# Patient Record
Sex: MALE | Race: WHITE | NOT HISPANIC OR LATINO | URBAN - METROPOLITAN AREA
[De-identification: names, ages, dates, MRNs, and addresses within clinical notes are randomized per-mention and may not be internally consistent; named-entity substitution may affect disease eponyms.]

---

## 2017-01-04 ENCOUNTER — INPATIENT (INPATIENT)
Facility: HOSPITAL | Age: 82
LOS: 7 days | Discharge: HOME | DRG: 61 | End: 2017-01-12
Attending: PSYCHIATRY & NEUROLOGY | Admitting: PSYCHIATRY & NEUROLOGY
Payer: MEDICARE

## 2017-01-04 VITALS
TEMPERATURE: 98 F | DIASTOLIC BLOOD PRESSURE: 85 MMHG | SYSTOLIC BLOOD PRESSURE: 175 MMHG | RESPIRATION RATE: 18 BRPM | HEART RATE: 75 BPM | OXYGEN SATURATION: 96 %

## 2017-01-04 DIAGNOSIS — Z78.9 OTHER SPECIFIED HEALTH STATUS: Chronic | ICD-10-CM

## 2017-01-04 DIAGNOSIS — I63.9 CEREBRAL INFARCTION, UNSPECIFIED: ICD-10-CM

## 2017-01-04 DIAGNOSIS — Z98.890 OTHER SPECIFIED POSTPROCEDURAL STATES: Chronic | ICD-10-CM

## 2017-01-04 LAB
ALBUMIN SERPL ELPH-MCNC: 3.9 G/DL — SIGNIFICANT CHANGE UP (ref 3.3–5)
ALP SERPL-CCNC: 51 U/L — SIGNIFICANT CHANGE UP (ref 40–120)
ALT FLD-CCNC: 20 U/L RC — SIGNIFICANT CHANGE UP (ref 10–45)
ANION GAP SERPL CALC-SCNC: 12 MMOL/L — SIGNIFICANT CHANGE UP (ref 5–17)
APTT BLD: 27.5 SEC — SIGNIFICANT CHANGE UP (ref 27.5–37.4)
AST SERPL-CCNC: 19 U/L — SIGNIFICANT CHANGE UP (ref 10–40)
BASOPHILS # BLD AUTO: 0 K/UL — SIGNIFICANT CHANGE UP (ref 0–0.2)
BASOPHILS NFR BLD AUTO: 0.7 % — SIGNIFICANT CHANGE UP (ref 0–2)
BILIRUB SERPL-MCNC: 0.3 MG/DL — SIGNIFICANT CHANGE UP (ref 0.2–1.2)
BLD GP AB SCN SERPL QL: NEGATIVE — SIGNIFICANT CHANGE UP
BUN SERPL-MCNC: 16 MG/DL — SIGNIFICANT CHANGE UP (ref 7–23)
CALCIUM SERPL-MCNC: 9.1 MG/DL — SIGNIFICANT CHANGE UP (ref 8.4–10.5)
CHLORIDE SERPL-SCNC: 103 MMOL/L — SIGNIFICANT CHANGE UP (ref 96–108)
CO2 SERPL-SCNC: 27 MMOL/L — SIGNIFICANT CHANGE UP (ref 22–31)
CREAT SERPL-MCNC: 1 MG/DL — SIGNIFICANT CHANGE UP (ref 0.5–1.3)
EOSINOPHIL # BLD AUTO: 0.1 K/UL — SIGNIFICANT CHANGE UP (ref 0–0.5)
EOSINOPHIL NFR BLD AUTO: 2.1 % — SIGNIFICANT CHANGE UP (ref 0–6)
GLUCOSE SERPL-MCNC: 193 MG/DL — HIGH (ref 70–99)
HCT VFR BLD CALC: 40.1 % — SIGNIFICANT CHANGE UP (ref 39–50)
HGB BLD-MCNC: 13.4 G/DL — SIGNIFICANT CHANGE UP (ref 13–17)
INR BLD: 1.01 RATIO — SIGNIFICANT CHANGE UP (ref 0.88–1.16)
LYMPHOCYTES # BLD AUTO: 1.5 K/UL — SIGNIFICANT CHANGE UP (ref 1–3.3)
LYMPHOCYTES # BLD AUTO: 22 % — SIGNIFICANT CHANGE UP (ref 13–44)
MCHC RBC-ENTMCNC: 31.3 PG — SIGNIFICANT CHANGE UP (ref 27–34)
MCHC RBC-ENTMCNC: 33.4 GM/DL — SIGNIFICANT CHANGE UP (ref 32–36)
MCV RBC AUTO: 93.5 FL — SIGNIFICANT CHANGE UP (ref 80–100)
MONOCYTES # BLD AUTO: 0.4 K/UL — SIGNIFICANT CHANGE UP (ref 0–0.9)
MONOCYTES NFR BLD AUTO: 6.2 % — SIGNIFICANT CHANGE UP (ref 2–14)
NEUTROPHILS # BLD AUTO: 4.6 K/UL — SIGNIFICANT CHANGE UP (ref 1.8–7.4)
NEUTROPHILS NFR BLD AUTO: 69 % — SIGNIFICANT CHANGE UP (ref 43–77)
PLATELET # BLD AUTO: 220 K/UL — SIGNIFICANT CHANGE UP (ref 150–400)
POTASSIUM SERPL-MCNC: 4.7 MMOL/L — SIGNIFICANT CHANGE UP (ref 3.5–5.3)
POTASSIUM SERPL-SCNC: 4.7 MMOL/L — SIGNIFICANT CHANGE UP (ref 3.5–5.3)
PROT SERPL-MCNC: 6.9 G/DL — SIGNIFICANT CHANGE UP (ref 6–8.3)
PROTHROM AB SERPL-ACNC: 11 SEC — SIGNIFICANT CHANGE UP (ref 10–13.1)
RBC # BLD: 4.29 M/UL — SIGNIFICANT CHANGE UP (ref 4.2–5.8)
RBC # FLD: 13.3 % — SIGNIFICANT CHANGE UP (ref 10.3–14.5)
RH IG SCN BLD-IMP: POSITIVE — SIGNIFICANT CHANGE UP
RH IG SCN BLD-IMP: POSITIVE — SIGNIFICANT CHANGE UP
SODIUM SERPL-SCNC: 142 MMOL/L — SIGNIFICANT CHANGE UP (ref 135–145)
TROPONIN T SERPL-MCNC: <0.01 NG/ML — SIGNIFICANT CHANGE UP (ref 0–0.06)
WBC # BLD: 6.7 K/UL — SIGNIFICANT CHANGE UP (ref 3.8–10.5)
WBC # FLD AUTO: 6.7 K/UL — SIGNIFICANT CHANGE UP (ref 3.8–10.5)

## 2017-01-04 PROCEDURE — 71010: CPT | Mod: 26

## 2017-01-04 PROCEDURE — 70450 CT HEAD/BRAIN W/O DYE: CPT | Mod: 26

## 2017-01-04 RX ORDER — DONEPEZIL HYDROCHLORIDE 10 MG/1
10 TABLET, FILM COATED ORAL AT BEDTIME
Qty: 0 | Refills: 0 | Status: DISCONTINUED | OUTPATIENT
Start: 2017-01-04 | End: 2017-01-10

## 2017-01-04 RX ORDER — ATORVASTATIN CALCIUM 80 MG/1
80 TABLET, FILM COATED ORAL AT BEDTIME
Qty: 0 | Refills: 0 | Status: DISCONTINUED | OUTPATIENT
Start: 2017-01-04 | End: 2017-01-10

## 2017-01-04 RX ORDER — ALTEPLASE 100 MG
6.8 KIT INTRAVENOUS ONCE
Qty: 0 | Refills: 0 | Status: COMPLETED | OUTPATIENT
Start: 2017-01-04 | End: 2017-01-04

## 2017-01-04 RX ORDER — ALTEPLASE 100 MG
61.2 KIT INTRAVENOUS ONCE
Qty: 0 | Refills: 0 | Status: COMPLETED | OUTPATIENT
Start: 2017-01-04 | End: 2017-01-04

## 2017-01-04 RX ORDER — CITALOPRAM 10 MG/1
20 TABLET, FILM COATED ORAL DAILY
Qty: 0 | Refills: 0 | Status: DISCONTINUED | OUTPATIENT
Start: 2017-01-04 | End: 2017-01-10

## 2017-01-04 RX ORDER — DILTIAZEM HCL 120 MG
240 CAPSULE, EXT RELEASE 24 HR ORAL DAILY
Qty: 0 | Refills: 0 | Status: DISCONTINUED | OUTPATIENT
Start: 2017-01-04 | End: 2017-01-10

## 2017-01-04 RX ORDER — LEVOTHYROXINE SODIUM 125 MCG
50 TABLET ORAL DAILY
Qty: 0 | Refills: 0 | Status: DISCONTINUED | OUTPATIENT
Start: 2017-01-04 | End: 2017-01-08

## 2017-01-04 RX ADMIN — ALTEPLASE 408 MILLIGRAM(S): KIT at 21:09

## 2017-01-04 RX ADMIN — ALTEPLASE 61.2 MILLIGRAM(S): KIT at 21:11

## 2017-01-04 NOTE — H&P ADULT. - PROBLEM SELECTOR PLAN 1
[] s/p tPA protocol  [] MRI Brain w/o  [] MRA Head/Neck  [] TTE, tele  [] A1c, LDL  [] Atorvastatin 80mg  [] PT/OT/SS

## 2017-01-04 NOTE — H&P ADULT. - ASSESSMENT
89yo R-handed  man PMH DM2, HTN, COPD presents as stroke code for R facial droop and dysarthria. NIHSS 3, MRS 3. Pt presented within extended window for tPA. After discussion with family re: risks and benefits of tPA, family consented for tPA. tPA administered at 9:09PM.

## 2017-01-04 NOTE — ED PROVIDER NOTE - ATTENDING CONTRIBUTION TO CARE
89 yom w 2 hrs of right facial droop and right pronator drift. 89 yom pmhx dm, htn, copd, presents from home w 2 hrs of right facial droop and right pronator drift w slurring of speech. lives at home w family and is ambulatory, performs some of his adls independently. is accompained by his home health aide and family - aide states that approx 2-3 hrs ago noted acute onset change in speech and right facial droop - called family and was brought to ED by EMS. family at bedside confirm dysarthria/right facial droop is new. code stroke called on arrival. severity - mod    timing - sudden    duration - 2-3 hrs    location -  right face   aggravating factors - none    ROS:   constitutional - no fever, no chills  eyes - no visual changes, no redness  eent - no sore throat, no nasal congestion  cvs - no chest pain, no leg swelling  resp - no shortness of breath, no cough  gi - no abdominal pain, no vomiting, no diarrhea  gu - no dysuria, no hematuria  msk - no acute back pain, no joint swelling  skin - no rashes, no jaundice  neuro - no headache, + new right facial droop/dysarthria  psych - no acute mental health issue     Physical Exam:   constitutional - well appearing, awake and alert, oriented x3  head - no external evidence of trauma  cvs - rrr, no murmurs, no peripheral edema  resp - breath sounds clear and equal bilat  gi - abdomen soft and nontender, no rigidity, guarding or rebound, bowel sounds present  msk - moving all extremities spontaneously  neuro - alert and oriented x3,+ dysarthria and right facial droop. strength 5/5 in all ext, no pronator drift. gait not tested.   skin- no jaundice, warm and dry  psych - mood and affect wnl, no apparent risk to self or others     ct without acute hemorrhage. extensive discussion w pt's family and stroke neuro resident at bedside. r/b/a of tpa discussed. family stated clearly that pt would not want to live w his current deficits as he would likely be 'too embarrased' to interact socially with current speech deficit. understood risk of potential hemorrhage. tpa given according to neuro recs. admit to stroke unit. HANY Rivas MD

## 2017-01-04 NOTE — ED ADULT NURSE NOTE - CHPI ED SYMPTOMS NEG
no change in level of consciousness/no disorientation/no fever/no back pain/no blurred vision/no decreased responsiveness/no tingling/no dizziness/no vomiting/no nausea/no numbness/no seizure/no sensory loss/no loss of consciousness

## 2017-01-04 NOTE — ED ADULT NURSE NOTE - NIH STROKE SCALE: 9. BEST LANGUAGE, QM
(1) Mild-to-moderate aphasia; some obvious loss of fluency or facility of comprehension, w/o significant limitation on ideas expressed or form of expression. Reduction of speech and/or comprehension, however, makes conversation about provided material difficult or impossible.  For example, in conversation about provided materials, examiner can identify picture or naming card content from patient's response.

## 2017-01-04 NOTE — ED ADULT NURSE NOTE - PMH
Chronic obstructive pulmonary disease, unspecified COPD type    Diabetes mellitus    Essential hypertension

## 2017-01-04 NOTE — H&P ADULT. - ATTENDING COMMENTS
The patient was seen and examined by me. Imaging (CT heaD) reviewed by me. This is a 89M with HTN, T2DM and COPD, who presented yesterday with acute onset of dysarthria and right facial droop. Head CT showed not bleed. He was treated with IV TPA. He was not an endovascula candidate because of low NIHSS, likley not proximal MCA occlusion, and advanced age. On exam, he is frail, awake and alert, Ox3, with moderate to severe dysarthria, a dense right facial droop, and a clumsy right hand, with a RUE drift and 5-/5 proximal RUE power, 4/5  and decreased FFM. He has 5/5 power RLE, and LUE/LLE, and normal sensation to LT. EOMI, VFF. NIHSS 5. Impression is cerebral embolism with infarction, clumsy hand dysarthria, so likely left MCA cortical or subcortical infarct. Plan is MRI, MRA neck, telemetry, post tPA neuro check and BP protocol. Continue stroke unit monitoring. PT/OT eval, failed dysphagia and needs speech and swallow, though I wiould urge comfort feeds if he fails instead of PEG given his advanced age. DVt prophylaxis with Venodynes until tonight, then Lovenox and start ASA if no bleed on repeat imaging. Holding antithrombotics post TPA for now.

## 2017-01-04 NOTE — ED ADULT NURSE NOTE - OBJECTIVE STATEMENT
89 y.o. Male presents to the ED c/o weakness. Pt's aide at bedside reports last seen normal was 1730. Pt's aide reports the patient going to the bathroom around 1830 and she noticed pt "couldn't talk, couldn't stand and c/o pain in legs." R facial droop with slurred speech noted. +ROM in all four extremities. A&Ox3. Follows commands. Pt usually walks with a walker. Denies CP, SOB, N/V/D, urinary/bowel complications, fever/chills. Pt is in no current distress. Comfort and safety provided. Family at bedside.

## 2017-01-04 NOTE — H&P ADULT. - HISTORY OF PRESENT ILLNESS
89yo R-handed  man PMH DM2, HTN, COPD presents as stroke code for R facial droop and dysarthria. At baseline, pt ambulates with walker and requires assistance with ADLs. Today, pt's aide reports that he was last normal sometime after 5:30PM. At around 6:30PM, aide noted pt to have facial asymmetry and slurred speech. On arrival in the ED, stroke code was called. Exam notable for R facial droop and dysarthria, which per aide and family is new. CT head was negative for acute intracranial pathology. After discussion with family regarding risks and benefits of tPA, tPA was administered at 2109. 89yo R-handed  man PMH DM2, HTN, COPD presents as stroke code for R facial droop and dysarthria. At baseline, pt ambulates with walker and requires assistance with ADLs. Today, pt's aide reports that he was last normal sometime after 5:30PM. At around 6:30PM, aide noted pt to have facial asymmetry and slurred speech. On arrival in the ED, stroke code was called. Exam notable for R facial droop and dysarthria, which per aide and family is new. CT head was negative for acute intracranial pathology. After discussion with family regarding risks and benefits of tPA, tPA was administered at 2109. He was not treated with endovascular stroke tehrapy given his low NIHSS and likely not a proximal occlusion.

## 2017-01-05 LAB
ANION GAP SERPL CALC-SCNC: 11 MMOL/L — SIGNIFICANT CHANGE UP (ref 5–17)
BASOPHILS # BLD AUTO: 0 K/UL — SIGNIFICANT CHANGE UP (ref 0–0.2)
BASOPHILS NFR BLD AUTO: 0.1 % — SIGNIFICANT CHANGE UP (ref 0–2)
BUN SERPL-MCNC: 15 MG/DL — SIGNIFICANT CHANGE UP (ref 7–23)
CALCIUM SERPL-MCNC: 8.8 MG/DL — SIGNIFICANT CHANGE UP (ref 8.4–10.5)
CHLORIDE SERPL-SCNC: 106 MMOL/L — SIGNIFICANT CHANGE UP (ref 96–108)
CHOLEST SERPL-MCNC: 156 MG/DL — SIGNIFICANT CHANGE UP (ref 10–199)
CO2 SERPL-SCNC: 26 MMOL/L — SIGNIFICANT CHANGE UP (ref 22–31)
CREAT SERPL-MCNC: 0.87 MG/DL — SIGNIFICANT CHANGE UP (ref 0.5–1.3)
EOSINOPHIL # BLD AUTO: 0.2 K/UL — SIGNIFICANT CHANGE UP (ref 0–0.5)
EOSINOPHIL NFR BLD AUTO: 1.7 % — SIGNIFICANT CHANGE UP (ref 0–6)
GLUCOSE SERPL-MCNC: 156 MG/DL — HIGH (ref 70–99)
HBA1C BLD-MCNC: 7.9 % — HIGH (ref 4–5.6)
HCT VFR BLD CALC: 37.9 % — LOW (ref 39–50)
HDLC SERPL-MCNC: 46 MG/DL — SIGNIFICANT CHANGE UP (ref 40–125)
HGB BLD-MCNC: 12.8 G/DL — LOW (ref 13–17)
LIPID PNL WITH DIRECT LDL SERPL: 85 MG/DL — SIGNIFICANT CHANGE UP
LYMPHOCYTES # BLD AUTO: 1.1 K/UL — SIGNIFICANT CHANGE UP (ref 1–3.3)
LYMPHOCYTES # BLD AUTO: 11 % — LOW (ref 13–44)
MCHC RBC-ENTMCNC: 31.5 PG — SIGNIFICANT CHANGE UP (ref 27–34)
MCHC RBC-ENTMCNC: 33.8 GM/DL — SIGNIFICANT CHANGE UP (ref 32–36)
MCV RBC AUTO: 93.2 FL — SIGNIFICANT CHANGE UP (ref 80–100)
MONOCYTES # BLD AUTO: 0.5 K/UL — SIGNIFICANT CHANGE UP (ref 0–0.9)
MONOCYTES NFR BLD AUTO: 5.2 % — SIGNIFICANT CHANGE UP (ref 2–14)
NEUTROPHILS # BLD AUTO: 8.6 K/UL — HIGH (ref 1.8–7.4)
NEUTROPHILS NFR BLD AUTO: 82 % — HIGH (ref 43–77)
PLATELET # BLD AUTO: 187 K/UL — SIGNIFICANT CHANGE UP (ref 150–400)
POTASSIUM SERPL-MCNC: 4.4 MMOL/L — SIGNIFICANT CHANGE UP (ref 3.5–5.3)
POTASSIUM SERPL-SCNC: 4.4 MMOL/L — SIGNIFICANT CHANGE UP (ref 3.5–5.3)
RBC # BLD: 4.07 M/UL — LOW (ref 4.2–5.8)
RBC # FLD: 13.3 % — SIGNIFICANT CHANGE UP (ref 10.3–14.5)
SODIUM SERPL-SCNC: 143 MMOL/L — SIGNIFICANT CHANGE UP (ref 135–145)
TOTAL CHOLESTEROL/HDL RATIO MEASUREMENT: 3.4 RATIO — SIGNIFICANT CHANGE UP (ref 3.4–9.6)
TRIGL SERPL-MCNC: 123 MG/DL — SIGNIFICANT CHANGE UP (ref 10–149)
WBC # BLD: 10.4 K/UL — SIGNIFICANT CHANGE UP (ref 3.8–10.5)
WBC # FLD AUTO: 10.4 K/UL — SIGNIFICANT CHANGE UP (ref 3.8–10.5)

## 2017-01-05 PROCEDURE — 70548 MR ANGIOGRAPHY NECK W/DYE: CPT | Mod: 26

## 2017-01-05 PROCEDURE — 70450 CT HEAD/BRAIN W/O DYE: CPT | Mod: 26

## 2017-01-05 PROCEDURE — 70544 MR ANGIOGRAPHY HEAD W/O DYE: CPT | Mod: 26,59

## 2017-01-05 PROCEDURE — 99223 1ST HOSP IP/OBS HIGH 75: CPT

## 2017-01-05 PROCEDURE — 70551 MRI BRAIN STEM W/O DYE: CPT | Mod: 26

## 2017-01-05 RX ORDER — ENOXAPARIN SODIUM 100 MG/ML
40 INJECTION SUBCUTANEOUS DAILY
Qty: 0 | Refills: 0 | Status: DISCONTINUED | OUTPATIENT
Start: 2017-01-05 | End: 2017-01-09

## 2017-01-05 RX ORDER — ASPIRIN/CALCIUM CARB/MAGNESIUM 324 MG
300 TABLET ORAL DAILY
Qty: 0 | Refills: 0 | Status: DISCONTINUED | OUTPATIENT
Start: 2017-01-05 | End: 2017-01-05

## 2017-01-05 RX ORDER — INSULIN LISPRO 100/ML
VIAL (ML) SUBCUTANEOUS
Qty: 0 | Refills: 0 | Status: DISCONTINUED | OUTPATIENT
Start: 2017-01-05 | End: 2017-01-12

## 2017-01-05 RX ORDER — INSULIN LISPRO 100/ML
VIAL (ML) SUBCUTANEOUS AT BEDTIME
Qty: 0 | Refills: 0 | Status: DISCONTINUED | OUTPATIENT
Start: 2017-01-05 | End: 2017-01-12

## 2017-01-05 RX ORDER — SODIUM CHLORIDE 0.65 %
1 AEROSOL, SPRAY (ML) NASAL EVERY 4 HOURS
Qty: 0 | Refills: 0 | Status: DISCONTINUED | OUTPATIENT
Start: 2017-01-05 | End: 2017-01-12

## 2017-01-05 RX ORDER — DEXTROSE 50 % IN WATER 50 %
1 SYRINGE (ML) INTRAVENOUS ONCE
Qty: 0 | Refills: 0 | Status: DISCONTINUED | OUTPATIENT
Start: 2017-01-05 | End: 2017-01-12

## 2017-01-05 RX ORDER — ASPIRIN/CALCIUM CARB/MAGNESIUM 324 MG
81 TABLET ORAL DAILY
Qty: 0 | Refills: 0 | Status: DISCONTINUED | OUTPATIENT
Start: 2017-01-05 | End: 2017-01-08

## 2017-01-05 RX ORDER — SODIUM CHLORIDE 9 MG/ML
1000 INJECTION INTRAMUSCULAR; INTRAVENOUS; SUBCUTANEOUS
Qty: 0 | Refills: 0 | Status: DISCONTINUED | OUTPATIENT
Start: 2017-01-05 | End: 2017-01-10

## 2017-01-05 RX ORDER — IPRATROPIUM/ALBUTEROL SULFATE 18-103MCG
3 AEROSOL WITH ADAPTER (GRAM) INHALATION EVERY 6 HOURS
Qty: 0 | Refills: 0 | Status: DISCONTINUED | OUTPATIENT
Start: 2017-01-05 | End: 2017-01-12

## 2017-01-05 RX ORDER — GLUCAGON INJECTION, SOLUTION 0.5 MG/.1ML
1 INJECTION, SOLUTION SUBCUTANEOUS ONCE
Qty: 0 | Refills: 0 | Status: DISCONTINUED | OUTPATIENT
Start: 2017-01-05 | End: 2017-01-12

## 2017-01-05 RX ORDER — DEXTROSE 50 % IN WATER 50 %
25 SYRINGE (ML) INTRAVENOUS ONCE
Qty: 0 | Refills: 0 | Status: DISCONTINUED | OUTPATIENT
Start: 2017-01-05 | End: 2017-01-12

## 2017-01-05 RX ORDER — DEXTROSE 50 % IN WATER 50 %
12.5 SYRINGE (ML) INTRAVENOUS ONCE
Qty: 0 | Refills: 0 | Status: DISCONTINUED | OUTPATIENT
Start: 2017-01-05 | End: 2017-01-12

## 2017-01-05 RX ORDER — SODIUM CHLORIDE 9 MG/ML
1000 INJECTION, SOLUTION INTRAVENOUS
Qty: 0 | Refills: 0 | Status: DISCONTINUED | OUTPATIENT
Start: 2017-01-05 | End: 2017-01-10

## 2017-01-05 RX ADMIN — ATORVASTATIN CALCIUM 80 MILLIGRAM(S): 80 TABLET, FILM COATED ORAL at 21:43

## 2017-01-05 RX ADMIN — DONEPEZIL HYDROCHLORIDE 10 MILLIGRAM(S): 10 TABLET, FILM COATED ORAL at 21:44

## 2017-01-05 RX ADMIN — Medication 3 MILLILITER(S): at 10:07

## 2017-01-05 RX ADMIN — Medication 240 MILLIGRAM(S): at 18:24

## 2017-01-05 RX ADMIN — Medication 3 MILLILITER(S): at 18:28

## 2017-01-05 NOTE — SWALLOW BEDSIDE ASSESSMENT ADULT - ASR SWALLOW ASPIRATION MONITOR
cough/change of breathing pattern/Monitor for s/s aspiration/laryngeal penetration. If noted:  D/C p.o. intake, provide non-oral nutrition/hydration/meds, and contact this service @ [phone]/throat clearing/upper respiratory infection/fever/gurgly voice/pneumonia

## 2017-01-05 NOTE — DISCHARGE NOTE ADULT - CARE PROVIDERS DIRECT ADDRESSES
[email or web address]

## 2017-01-05 NOTE — SPEECH LANGUAGE PATHOLOGY EVALUATION - SLP PERTINENT HISTORY OF CURRENT PROBLEM
89yo R-handed  man PMH DM2, HTN, COPD presents as stroke code for R facial droop and dysarthria. At baseline, pt ambulates with walker and requires assistance with ADLs. Today, pt's aide reports that he was last normal sometime after 5:30PM. At around 6:30PM, aide noted pt to have facial asymmetry and slurred speech. On arrival in the ED, stroke code was called. Exam notable for R facial droop and dysarthria, which per aide and family is new. CT head was negative for acute intracranial pathology.

## 2017-01-05 NOTE — OCCUPATIONAL THERAPY INITIAL EVALUATION ADULT - PLANNED THERAPY INTERVENTIONS, OT EVAL
transfer training/neuromuscular re-education/ROM/ADL retraining/balance training/bed mobility training/strengthening

## 2017-01-05 NOTE — DISCHARGE NOTE ADULT - MEDICATION SUMMARY - MEDICATIONS TO CHANGE
I will SWITCH the dose or number of times a day I take the medications listed below when I get home from the hospital:    Lipitor 10 mg oral tablet  -- 1 tab(s) by mouth once a day at bedtime

## 2017-01-05 NOTE — OCCUPATIONAL THERAPY INITIAL EVALUATION ADULT - LIVES WITH, PROFILE
alone/lives alone in coop with elevator access, has 24 hour home health aid who assist with all ADLs, per Pt and son he ambulated in home with rolling walker and used transport WC outside of home

## 2017-01-05 NOTE — DISCHARGE NOTE ADULT - PLAN OF CARE
prevention of further strokes Continue with medications as prescribed. Follow up with neurology as instructed.

## 2017-01-05 NOTE — DISCHARGE NOTE ADULT - HOSPITAL COURSE
89yo R-handed  man PMH DM2, HTN, COPD presents as stroke code for R facial droop and dysarthria. At baseline, pt ambulates with walker and requires assistance with ADLs. Today, pt's aide reports that he was last normal sometime after 5:30PM. At around 6:30PM, aide noted pt to have facial asymmetry and slurred speech. On arrival in the ED, stroke code was called. Exam notable for R facial droop and dysarthria, which per aide and family is new. CT head was negative for acute intracranial pathology. After discussion with family regarding risks and benefits of tPA, tPA was administered at 2109. He was not treated with endovascular stroke tehrapy given his low NIHSS and likely not a proximal occlusion.    Pt admitted to the stroke service, post TPA protocol followed. CTH repeatated at 12 hrs for worsening + evolving L basal ganglia infarct.  RUE weakness,  Extensive work up done including MRI brain, MRA Head neck showing _________. TTE/STEVEN done showing _________.  Etiology of stroke likely small vessel ischemic disease. Continue medications as directed, follow up with PT/OT as needed. Follow up with neurology as directed. LDL < 100, goal a1c < 7, goal SBP < 140.  Cardiology and Endocrine consulted recommending _________.  Pt started on _________ prior to DC.  Hospital course complicated by __________.   Pt also seen and evaluated by PT/OT recommending __________. Pt seen by speech and swallow recommending a ___________ diet. Follow up with neurology as directed. 89yo R-handed  man PMH DM2, HTN, COPD presents as stroke code for R facial droop and dysarthria. At baseline, pt ambulates with walker and requires assistance with ADLs. Today, pt's aide reports that he was last normal sometime after 5:30PM. At around 6:30PM, aide noted pt to have facial asymmetry and slurred speech. On arrival in the ED, stroke code was called. Exam notable for R facial droop and dysarthria, which per aide and family is new. CT head was negative for acute intracranial pathology. After discussion with family regarding risks and benefits of tPA, tPA was administered at 2109. He was not treated with endovascular stroke tehrapy given his low NIHSS and likely not a proximal occlusion.    Pt admitted to the stroke service, post TPA protocol followed. CTH repeatated at 12 hrs for worsening + evolving L basal ganglia infarct.  RUE weakness,  Extensive work up done including MRI brain, MRA Head neck showing acute infarct of the left basal ganglia and corona radiata.  Etiology of stroke likely small vessel ischemic disease. Continue medications as directed, follow up with PT/OT as needed. Follow up with neurology as directed. LDL < 100, goal a1c < 7, goal SBP < 140.  Pt started on aspirin and Plavix prior to DC.    Pt also seen and evaluated by PT/OT recommending -________. Pt seen by speech and swallow, Pt had modified barium swallow and recommending a ___________ diet. Follow up with neurology as directed. 89yo R-handed  man PMH DM2, HTN, COPD presents as stroke code for R facial droop and dysarthria. At baseline, pt ambulates with walker and requires assistance with ADLs. Today, pt's aide reports that he was last normal sometime after 5:30PM. At around 6:30PM, aide noted pt to have facial asymmetry and slurred speech. On arrival in the ED, stroke code was called. Exam notable for R facial droop and dysarthria, which per aide and family is new. CT head was negative for acute intracranial pathology. After discussion with family regarding risks and benefits of tPA, tPA was administered at 2109. He was not treated with endovascular stroke tehrapy given his low NIHSS and likely not a proximal occlusion.    Pt admitted to the stroke service, post TPA protocol followed. CTH repeatated at 12 hrs for worsening + evolving L basal ganglia infarct.  RUE weakness,  Extensive work up done including MRI brain, MRA Head neck showing acute infarct of the left basal ganglia and corona radiata.  Etiology of stroke likely small vessel ischemic disease. Continue medications as directed, follow up with PT/OT as needed. Follow up with neurology as directed. LDL < 100, goal a1c < 7, goal SBP < 140.  Pt started on aspirin and Plavix prior to DC.    Pt also seen and evaluated by PT/OT recommending acute rehab. Pt seen by speech and swallow, Pt had modified barium swallow and recommending a NPO diet secondary to silent aspiration- GI consulted for PEG placement for long term enteral nutrition, placed 1/10/17- no complications. Follow up with neurology as directed.

## 2017-01-05 NOTE — SWALLOW BEDSIDE ASSESSMENT ADULT - SLP GENERAL OBSERVATIONS
Pt found in bed awake and alert. Moderate-Severe Dysarthria. Pt follows commands and serves as a fair historian. Pt verbal expresses wants and needs however he requires verbal cues to slow rate of speech and over articulate in order to increase intelligibility.

## 2017-01-05 NOTE — OCCUPATIONAL THERAPY INITIAL EVALUATION ADULT - PRECAUTIONS/LIMITATIONS, REHAB EVAL
continue:CT head was negative for acute intracranial pathology. After discussion with family regarding risks and benefits of tPA, tPA was administered at 2109. He was not treated with endovascular stroke tehrapy given his low NIHSS and likely not a proximal occlusion.

## 2017-01-05 NOTE — DISCHARGE NOTE ADULT - PATIENT PORTAL LINK FT
“You can access the [facility] Patient Portal, offered by [facility], by registering with the following website: [email or web address]”

## 2017-01-05 NOTE — SWALLOW BEDSIDE ASSESSMENT ADULT - ORAL PHASE
Within functional limits Decreased anterior-posterior movement of the bolus/Delayed oral transit time/Lingual stasis suspected uncontrolled loss of bolus

## 2017-01-05 NOTE — DIETITIAN INITIAL EVALUATION ADULT. - PT NOT SOURCE
pt able to provide limited info 2' slurred speech, able to answer yes/no question, no family at bedside

## 2017-01-05 NOTE — SPEECH LANGUAGE PATHOLOGY EVALUATION - SLP DIAGNOSIS
Pt presents with a moderate-severe dysarthria characterized by blended word boundaries and imprecise articulation at the word level. Pt educated and instructed on utilized a reduced rate of speech and over articulation to facilitate improved intelligibility.

## 2017-01-05 NOTE — DISCHARGE NOTE ADULT - CARE PLAN
Principal Discharge DX:	Cerebrovascular accident (CVA), unspecified mechanism  Goal:	prevention of further strokes  Instructions for follow-up, activity and diet:	Continue with medications as prescribed. Follow up with neurology as instructed.

## 2017-01-05 NOTE — SWALLOW BEDSIDE ASSESSMENT ADULT - MODE OF PRESENTATION
spoon/self fed/cup/Pt did not generate enough negative pressure to utilize straw effectively. cup self fed

## 2017-01-05 NOTE — DIETITIAN INITIAL EVALUATION ADULT. - ADHERENCE
unable to assess "diabetic", checks blood sugars at home couldn't provide results, unable to provide diet recall, declined diet education

## 2017-01-05 NOTE — DISCHARGE NOTE ADULT - MEDICATION SUMMARY - MEDICATIONS TO STOP TAKING
I will STOP taking the medications listed below when I get home from the hospital:    traMADol 50 mg oral tablet  -- 1 tab(s) by mouth once a day (at bedtime), As Needed I will STOP taking the medications listed below when I get home from the hospital:    diltiaZEM 240 mg/24 hours oral tablet, extended release  -- 1 tab(s) by mouth once a day    traMADol 50 mg oral tablet  -- 1 tab(s) by mouth once a day (at bedtime), As Needed

## 2017-01-05 NOTE — SPEECH LANGUAGE PATHOLOGY EVALUATION - SPECIFY REASON(S)
to assess the swallow mechanism; r/o dysphagia. To assess speech, language and cognitive-linguistic skills.

## 2017-01-05 NOTE — DISCHARGE NOTE ADULT - NS AS DC STROKE ED MATERIALS
Stroke Warning Signs and Symptoms/Prescribed Medications/Stroke Education Booklet/Need for Followup After Discharge/Call 911 for Stroke/Risk Factors for Stroke

## 2017-01-05 NOTE — SWALLOW BEDSIDE ASSESSMENT ADULT - SWALLOW EVAL: DIAGNOSIS
Pt presents with an oral and suspected pharyngeal dysphagia characterized by reduced mastication for mechanical soft solids, reduced A-P transport of solids evidenced by oral residue on R side of lingual surface and increased oral transit time. Pt w/ overt s/s of laryngeal penetration/aspiration post swallow of thin liquids.

## 2017-01-05 NOTE — DIETITIAN INITIAL EVALUATION ADULT. - NS AS NUTRI INTERV COLLABORAT
Collaboration with other providers/recommend swallow eval, IVF while NPO if not contraindicated, point of care glucose testing

## 2017-01-05 NOTE — SPEECH LANGUAGE PATHOLOGY EVALUATION - COMMENTS
After discussion with family regarding risks and benefits of tPA, tPA was administered at 2109. He was not treated with endovascular stroke tehrapy given his low NIHSS and likely not a proximal occlusion. He was not an endovascula candidate because of low NIHSS, likley not proximal MCA occlusion, and advanced age. On exam, he is frail, awake and alert, Ox3, with moderate to severe dysarthria, a dense right facial droop, and a clumsy right hand, with a RUE drift and 5-/5 proximal RUE power, 4/5  and decreased FFM. He has 5/5 power RLE, and LUE/LLE, and normal sensation to LT. EOMI, VFF. NIHSS 5. Impression is cerebral embolism with infarction, clumsy hand dysarthria, so likely left MCA cortical or subcortical infarct. 1/5 CTH: New lucency in the left lentiform nucleus/external capsule suspicious for an evolving acute infarct. No acute hemorrhage. Correlate with MRI ordered by the time of this dictation. CXR: Negative. Functional at the conversational level. Did not assess Did not assess. After discussion with family regarding risks and benefits of tPA, tPA was administered at 2109. He was not treated with endovascular stroke tehrapy given his low NIHSS and likely not a proximal occlusion. He was not an endovascula candidate because of low NIHSS, likely not proximal MCA occlusion, and advanced age. On exam, he is frail, awake and alert, Ox3, with moderate to severe dysarthria, a dense right facial droop, and a clumsy right hand, with a RUE drift and 5-/5 proximal RUE power, 4/5  and decreased FFM. He has 5/5 power RLE, and LUE/LLE, and normal sensation to LT. EOMI, VFF. NIHSS 5. Impression is cerebral embolism with infarction, clumsy hand dysarthria, so likely left MCA cortical or subcortical infarct. 1/5 CTH: New lucency in the left lentiform nucleus/external capsule suspicious for an evolving acute infarct. No acute hemorrhage. Correlate with MRI ordered by the time of this dictation. CXR: Negative.

## 2017-01-05 NOTE — SWALLOW BEDSIDE ASSESSMENT ADULT - ORAL PREPARATORY PHASE
Within functional limits Likely 2/2 dentition in combination with reduced bolus formation/manipulation of bolus 2/2 generalized lingual weakness/Decreased mastication ability

## 2017-01-05 NOTE — SWALLOW BEDSIDE ASSESSMENT ADULT - SLP PERTINENT HISTORY OF CURRENT PROBLEM
89yo R-handed  man PMH DM2, HTN, COPD presents as stroke code for R facial droop and dysarthria. At baseline, pt ambulates with walker and requires assistance with ADLs. Today, pt's aide reports that he was last normal sometime after 5:30PM. At around 6:30PM, aide noted pt to have facial asymmetry and slurred speech. On arrival in the ED, stroke code was called. Exam notable for R facial droop and dysarthria, which per aide and family is new. CT head was negative for acute intracranial pathology.

## 2017-01-05 NOTE — DISCHARGE NOTE ADULT - CARE PROVIDER_API CALL
Adan Colon (ANTOINE), Neurology; Vascular Neurology  [address] [place] [address]  Phone: [phone]  Fax: [phone]

## 2017-01-05 NOTE — DISCHARGE NOTE ADULT - MEDICATION SUMMARY - MEDICATIONS TO TAKE
I will START or STAY ON the medications listed below when I get home from the hospital:    aspirin 81 mg oral tablet, chewable  -- 1 tab(s) by mouth once a day  -- Indication: For Cerebral infarction    diltiaZEM 240 mg/24 hours oral capsule, extended release  -- 1 cap(s) by mouth once a day  -- Indication: For Hypertension    citalopram 20 mg oral tablet  -- 1 tab(s) by mouth once a day  -- Indication: For Depressino    Prandin 0.5 mg oral tablet  -- 1 tab(s) by mouth 3 times a day (before meals)  -- Indication: For Diabetes mellitus    Lantus 100 units/mL subcutaneous solution  -- 23 unit(s) subcutaneous once a day (at bedtime)  -- Indication: For Diabetes mellitus    atorvastatin 80 mg oral tablet  -- 1 tab(s) by mouth once a day (at bedtime)  -- Indication: For HLD    Plavix 75 mg oral tablet  -- 1 tab(s) by mouth once a day after diner  -- Indication: For CVA    Aricept 10 mg oral tablet  -- 1 tab(s) by mouth once a day (at bedtime)  -- Indication: For Dementia    levothyroxine 50 mcg (0.05 mg) oral tablet  -- 1 tab(s) by mouth once a day  -- Indication: For Hypothyroid    Vitamin D3  -- 2 tab(s) by mouth once a day  -- Indication: For Supplement I will START or STAY ON the medications listed below when I get home from the hospital:    aspirin 81 mg oral tablet, chewable  -- 1 tab(s) by mouth once a day  -- Indication: For Cerebral infarction    lisinopril 2.5 mg oral tablet  -- 1 tab(s) by mouth once a day  -- Indication: For HTN    Cardizem 60 mg oral tablet  -- 1 tab(s) by mouth every 6 hours  -- Indication: For HTN    citalopram 20 mg oral tablet  -- 1 tab(s) by mouth once a day  -- Indication: For Depression    Lantus 100 units/mL subcutaneous solution  -- 23 unit(s) subcutaneous once a day (at bedtime)  -- Indication: For Diabetes mellitus    Prandin 0.5 mg oral tablet  -- 1 tab(s) by mouth 3 times a day (before meals)  -- Indication: For Diabetes mellitus    atorvastatin 80 mg oral tablet  -- 1 tab(s) by mouth once a day (at bedtime)  -- Indication: For HLD    Plavix 75 mg oral tablet  -- 1 tab(s) by mouth once a day after diner  -- Indication: For CVA    donepezil 10 mg oral tablet  -- 1 tab(s) by mouth once a day (at bedtime)  -- Indication: For Dementia    levothyroxine 50 mcg (0.05 mg) oral tablet  -- 1 tab(s) by mouth once a day  -- Indication: For Hypothyroid    Vitamin D3  -- 2 tab(s) by mouth once a day  -- Indication: For Supplement

## 2017-01-05 NOTE — SWALLOW BEDSIDE ASSESSMENT ADULT - PHARYNGEAL PHASE
Within functional limits Cough post oral intake Multiple swallows/Wet vocal quality post oral intake

## 2017-01-05 NOTE — SWALLOW BEDSIDE ASSESSMENT ADULT - COMMENTS
After discussion with family regarding risks and benefits of tPA, tPA was administered at 2109. He was not treated with endovascular stroke tehrapy given his low NIHSS and likely not a proximal occlusion. He was not an endovascula candidate because of low NIHSS, likley not proximal MCA occlusion, and advanced age. On exam, he is frail, awake and alert, Ox3, with moderate to severe dysarthria, a dense right facial droop, and a clumsy right hand, with a RUE drift and 5-/5 proximal RUE power, 4/5  and decreased FFM. He has 5/5 power RLE, and LUE/LLE, and normal sensation to LT. EOMI, VFF. NIHSS 5. Impression is cerebral embolism with infarction, clumsy hand dysarthria, so likely left MCA cortical or subcortical infarct. 1/5 CTH: New lucency in the left lentiform nucleus/external capsule suspicious for an evolving acute infarct. No acute hemorrhage. Correlate with MRI ordered by the time of this dictation. CXR: Negative.

## 2017-01-05 NOTE — OCCUPATIONAL THERAPY INITIAL EVALUATION ADULT - MANUAL MUSCLE TESTING RESULTS, REHAB EVAL
R shoulder 2-/5, R knee 3/5, R hip 3-/5, R elbow, wrist and digits 0/5, LUE 3/5, LLE 3/5/grossly assessed due to

## 2017-01-05 NOTE — SWALLOW BEDSIDE ASSESSMENT ADULT - ASR SWALLOW RECOMMEND DIAG
VFSS/MBS/MD, PLEASE ENTER ORDER FOR MODIFIED BARIUM SWALLOW STUDY (ENTER UNDER RADIOLOGY EXAMS THE FOLLOWING WAY: GO TO THE BROWSER AND TYPE IN XRAY, THEN HIT THE SPACEBAR, AND TYPE IN THE LETTER M.)  WILL SCHEDULE EXAM UPON RECEIPT IN RADIOLOGY.

## 2017-01-05 NOTE — DIETITIAN INITIAL EVALUATION ADULT. - OTHER INFO
Pt seen for: consult for difficulty chewing/swallowing.  Adm dx: CVA   PMH: DM2, HTN, COPD  GI issues: denies N/V/D   Last BM: pt unsure   Food allergies: NKFA   Vit/supplement PTA: vitamin D.  Pt shook head no to any wt gain or loss. Pt seen for: consult for difficulty chewing/swallowing.  Adm dx: CVA   PMH: DM2, HTN, COPD  GI issues: denies N/V/D   Last BM: pt unsure   Food allergies: NKFA   Vit/supplement PTA: vitamin D.  Pt  denies any wt gain or loss.

## 2017-01-05 NOTE — OCCUPATIONAL THERAPY INITIAL EVALUATION ADULT - PERTINENT HX OF CURRENT PROBLEM, REHAB EVAL
89yo R-handed  man PMH DM2, HTN, COPD presents as stroke code for R facial droop and dysarthria. At baseline, pt ambulates with walker and requires assistance with ADLs. Today, pt's aide reports that he was last normal sometime after 5:30PM. At around 6:30PM, aide noted pt to have facial asymmetry and slurred speech. On arrival in the ED, stroke code was called. Exam notable for R facial droop and dysarthria, which per aide and family is new.

## 2017-01-06 LAB
ANION GAP SERPL CALC-SCNC: 12 MMOL/L — SIGNIFICANT CHANGE UP (ref 5–17)
BUN SERPL-MCNC: 13 MG/DL — SIGNIFICANT CHANGE UP (ref 7–23)
CALCIUM SERPL-MCNC: 8.5 MG/DL — SIGNIFICANT CHANGE UP (ref 8.4–10.5)
CHLORIDE SERPL-SCNC: 102 MMOL/L — SIGNIFICANT CHANGE UP (ref 96–108)
CO2 SERPL-SCNC: 26 MMOL/L — SIGNIFICANT CHANGE UP (ref 22–31)
CREAT SERPL-MCNC: 0.81 MG/DL — SIGNIFICANT CHANGE UP (ref 0.5–1.3)
GLUCOSE SERPL-MCNC: 164 MG/DL — HIGH (ref 70–99)
HCT VFR BLD CALC: 36.1 % — LOW (ref 39–50)
HGB BLD-MCNC: 12.5 G/DL — LOW (ref 13–17)
MCHC RBC-ENTMCNC: 32.5 PG — SIGNIFICANT CHANGE UP (ref 27–34)
MCHC RBC-ENTMCNC: 34.8 GM/DL — SIGNIFICANT CHANGE UP (ref 32–36)
MCV RBC AUTO: 93.4 FL — SIGNIFICANT CHANGE UP (ref 80–100)
PLATELET # BLD AUTO: 182 K/UL — SIGNIFICANT CHANGE UP (ref 150–400)
POTASSIUM SERPL-MCNC: 4.2 MMOL/L — SIGNIFICANT CHANGE UP (ref 3.5–5.3)
POTASSIUM SERPL-SCNC: 4.2 MMOL/L — SIGNIFICANT CHANGE UP (ref 3.5–5.3)
RBC # BLD: 3.86 M/UL — LOW (ref 4.2–5.8)
RBC # FLD: 13.3 % — SIGNIFICANT CHANGE UP (ref 10.3–14.5)
SODIUM SERPL-SCNC: 140 MMOL/L — SIGNIFICANT CHANGE UP (ref 135–145)
WBC # BLD: 7.6 K/UL — SIGNIFICANT CHANGE UP (ref 3.8–10.5)
WBC # FLD AUTO: 7.6 K/UL — SIGNIFICANT CHANGE UP (ref 3.8–10.5)

## 2017-01-06 PROCEDURE — 99233 SBSQ HOSP IP/OBS HIGH 50: CPT

## 2017-01-06 RX ORDER — ASPIRIN/CALCIUM CARB/MAGNESIUM 324 MG
1 TABLET ORAL
Qty: 0 | Refills: 0 | COMMUNITY
Start: 2017-01-06

## 2017-01-06 RX ORDER — CITALOPRAM 10 MG/1
1 TABLET, FILM COATED ORAL
Qty: 0 | Refills: 0 | COMMUNITY
Start: 2017-01-06

## 2017-01-06 RX ORDER — ATORVASTATIN CALCIUM 80 MG/1
1 TABLET, FILM COATED ORAL
Qty: 0 | Refills: 0 | COMMUNITY
Start: 2017-01-06

## 2017-01-06 RX ADMIN — ENOXAPARIN SODIUM 40 MILLIGRAM(S): 100 INJECTION SUBCUTANEOUS at 06:32

## 2017-01-06 RX ADMIN — SODIUM CHLORIDE 20 MILLILITER(S): 9 INJECTION INTRAMUSCULAR; INTRAVENOUS; SUBCUTANEOUS at 21:01

## 2017-01-06 RX ADMIN — Medication 3 MILLILITER(S): at 23:29

## 2017-01-06 RX ADMIN — Medication 1: at 18:17

## 2017-01-06 RX ADMIN — Medication 240 MILLIGRAM(S): at 21:01

## 2017-01-06 RX ADMIN — Medication 50 MICROGRAM(S): at 13:22

## 2017-01-06 RX ADMIN — CITALOPRAM 20 MILLIGRAM(S): 10 TABLET, FILM COATED ORAL at 13:22

## 2017-01-06 RX ADMIN — Medication 1 SPRAY(S): at 22:22

## 2017-01-06 RX ADMIN — ENOXAPARIN SODIUM 40 MILLIGRAM(S): 100 INJECTION SUBCUTANEOUS at 06:31

## 2017-01-06 RX ADMIN — ATORVASTATIN CALCIUM 80 MILLIGRAM(S): 80 TABLET, FILM COATED ORAL at 21:01

## 2017-01-06 RX ADMIN — Medication 1: at 13:23

## 2017-01-06 RX ADMIN — Medication 3 MILLILITER(S): at 18:04

## 2017-01-06 RX ADMIN — Medication 3 MILLILITER(S): at 05:35

## 2017-01-06 RX ADMIN — Medication 3 MILLILITER(S): at 00:12

## 2017-01-06 RX ADMIN — Medication 81 MILLIGRAM(S): at 06:32

## 2017-01-06 RX ADMIN — SODIUM CHLORIDE 20 MILLILITER(S): 9 INJECTION INTRAMUSCULAR; INTRAVENOUS; SUBCUTANEOUS at 10:06

## 2017-01-06 RX ADMIN — Medication 1: at 10:06

## 2017-01-06 RX ADMIN — DONEPEZIL HYDROCHLORIDE 10 MILLIGRAM(S): 10 TABLET, FILM COATED ORAL at 21:01

## 2017-01-06 RX ADMIN — Medication 3 MILLILITER(S): at 13:23

## 2017-01-06 NOTE — PHYSICAL THERAPY INITIAL EVALUATION ADULT - MANUAL MUSCLE TESTING RESULTS, REHAB EVAL
grossly assessed due to/R shoulder 2-/5, R knee 3/5, R hip 3-/5, R elbow, wrist and digits 0/5, LUE 3/5, LLE 3/5

## 2017-01-06 NOTE — PHYSICAL THERAPY INITIAL EVALUATION ADULT - DIAGNOSIS, PT EVAL
decreased functional mobility secondary to generalized weakness, impaired balance, decreased endurance

## 2017-01-06 NOTE — PHYSICAL THERAPY INITIAL EVALUATION ADULT - BED MOBILITY LIMITATIONS, REHAB EVAL
decreased ability to use legs for bridging/pushing/decreased ability to use arms for pushing/pulling/impaired ability to control trunk for mobility

## 2017-01-06 NOTE — PHYSICAL THERAPY INITIAL EVALUATION ADULT - ADDITIONAL COMMENTS
As per chart pt resides in apartment with elevator access with 24/7 aides. Patient's son Ruperto lives 1 block away and spends the day with patient along with aide. At baseline, patient ambulates with walker and requires assistance with ADLs. Patient has a walker, wheelchair, transport chair, oxygen and nebulizer at home.    01/05 MRI head/neck: Focal restricted diffusion with increased DWI signal likely new ischemic change in the left centrum semiovale, left posterior limb of the internal capsule, left putamen and left subinsular cortex without hemorrhagic transformation. Tortuous extra cranial vessels likely from hypertension with severe stenoses at the origin of the left common carotid artery, right internal carotid artery, intradural left vertebral artery, right greater than left cavernous internal carotid artery segments, and bilateral anterior, middle, and posterior cerebral arteries as detailed above.

## 2017-01-06 NOTE — PHYSICAL THERAPY INITIAL EVALUATION ADULT - ACTIVE RANGE OF MOTION EXAMINATION, REHAB EVAL
bilateral  lower extremity Active ROM was WFL (within functional limits)/Left UE Active ROM was WFL (within functional limits)

## 2017-01-06 NOTE — PHYSICAL THERAPY INITIAL EVALUATION ADULT - PERTINENT HX OF CURRENT PROBLEM, REHAB EVAL
89yo R-handed  man PMH DM2, HTN, COPD presents as stroke code for R facial droop and dysarthria. 01/05 Head CT: New lucency in the left lentiform nucleus/external capsule suspicious for an evolving acute infarct. No acute hemorrhage. Correlate with MRI ordered by the time of this dictation.

## 2017-01-06 NOTE — PHYSICAL THERAPY INITIAL EVALUATION ADULT - GAIT DEVIATIONS NOTED, PT EVAL
decreased weight-shifting ability/increased time in double stance/decreased demetrice/decreased step length

## 2017-01-07 LAB
ANION GAP SERPL CALC-SCNC: 14 MMOL/L — SIGNIFICANT CHANGE UP (ref 5–17)
BUN SERPL-MCNC: 21 MG/DL — SIGNIFICANT CHANGE UP (ref 7–23)
CALCIUM SERPL-MCNC: 8.6 MG/DL — SIGNIFICANT CHANGE UP (ref 8.4–10.5)
CHLORIDE SERPL-SCNC: 104 MMOL/L — SIGNIFICANT CHANGE UP (ref 96–108)
CO2 SERPL-SCNC: 25 MMOL/L — SIGNIFICANT CHANGE UP (ref 22–31)
CREAT SERPL-MCNC: 0.94 MG/DL — SIGNIFICANT CHANGE UP (ref 0.5–1.3)
GLUCOSE SERPL-MCNC: 172 MG/DL — HIGH (ref 70–99)
HCT VFR BLD CALC: 36.3 % — LOW (ref 39–50)
HGB BLD-MCNC: 12.5 G/DL — LOW (ref 13–17)
MCHC RBC-ENTMCNC: 32.4 PG — SIGNIFICANT CHANGE UP (ref 27–34)
MCHC RBC-ENTMCNC: 34.5 GM/DL — SIGNIFICANT CHANGE UP (ref 32–36)
MCV RBC AUTO: 94 FL — SIGNIFICANT CHANGE UP (ref 80–100)
PLATELET # BLD AUTO: 190 K/UL — SIGNIFICANT CHANGE UP (ref 150–400)
POTASSIUM SERPL-MCNC: 4.3 MMOL/L — SIGNIFICANT CHANGE UP (ref 3.5–5.3)
POTASSIUM SERPL-SCNC: 4.3 MMOL/L — SIGNIFICANT CHANGE UP (ref 3.5–5.3)
RBC # BLD: 3.86 M/UL — LOW (ref 4.2–5.8)
RBC # FLD: 13.2 % — SIGNIFICANT CHANGE UP (ref 10.3–14.5)
SODIUM SERPL-SCNC: 143 MMOL/L — SIGNIFICANT CHANGE UP (ref 135–145)
WBC # BLD: 8 K/UL — SIGNIFICANT CHANGE UP (ref 3.8–10.5)
WBC # FLD AUTO: 8 K/UL — SIGNIFICANT CHANGE UP (ref 3.8–10.5)

## 2017-01-07 PROCEDURE — 74230 X-RAY XM SWLNG FUNCJ C+: CPT | Mod: 26

## 2017-01-07 PROCEDURE — 99233 SBSQ HOSP IP/OBS HIGH 50: CPT

## 2017-01-07 RX ORDER — HYDRALAZINE HCL 50 MG
10 TABLET ORAL ONCE
Qty: 0 | Refills: 0 | Status: COMPLETED | OUTPATIENT
Start: 2017-01-07 | End: 2017-01-07

## 2017-01-07 RX ADMIN — Medication 1: at 12:51

## 2017-01-07 RX ADMIN — ENOXAPARIN SODIUM 40 MILLIGRAM(S): 100 INJECTION SUBCUTANEOUS at 12:50

## 2017-01-07 RX ADMIN — Medication 1 SPRAY(S): at 14:59

## 2017-01-07 RX ADMIN — Medication 1: at 10:01

## 2017-01-07 RX ADMIN — Medication 10 MILLIGRAM(S): at 23:02

## 2017-01-07 RX ADMIN — Medication 3 MILLILITER(S): at 17:02

## 2017-01-07 RX ADMIN — Medication 3 MILLILITER(S): at 06:00

## 2017-01-07 RX ADMIN — Medication 3 MILLILITER(S): at 12:50

## 2017-01-07 NOTE — SWALLOW VFSS/MBS ASSESSMENT ADULT - ADDITIONAL RECOMMENDATIONS
Maintain good oral hygiene.  Restorative speech and swallow therapy in the rehabilitation facility s/p PEG placement.  Patient with DM. VI Beal made aware of po trials provided during evaluation.

## 2017-01-07 NOTE — SWALLOW VFSS/MBS ASSESSMENT ADULT - DIAGNOSTIC IMPRESSIONS
Pt presents with an oropharyngeal dysphagia marked by impaired oral management with moderate diffuse post swallow residue, anterior loss, spillover of oral residue, and laryngeal penetration and aspiration of most conservative dietary consistencies during and after the initial swallow.   Disorders include significantly reduced labial and lingual strength/ROM/Rate of motion (on right), reduced BOT to posterior pharyngeal wall contact, delay in trigger of the swallow reflex, reduced hyo-laryngeal excursion, reduced laryngeal closure, reduced pharyngeal contractility, and signs of reduced supraglottic and subglottic sensation. Pt presents with an oropharyngeal dysphagia marked by impaired oral management with mild diffuse post swallow residue, most markedly with thick puree, post swallow anterior loss on the right, and laryngeal penetration and aspiration of most conservative dietary consistencies during and after the initial swallow. Pt insensate to oral and pharyngeal residue. A right head rotation was not successful in maintaining airway protection.   Disorders include significantly reduced labial and lingual strength/ROM/Rate of motion (on right), reduced BOT to posterior pharyngeal wall contact, delay in trigger of the swallow reflex, reduced hyo-laryngeal excursion, reduced laryngeal closure, reduced pharyngeal contractility, and signs of reduced supraglottic and subglottic sensation.

## 2017-01-07 NOTE — SWALLOW VFSS/MBS ASSESSMENT ADULT - COMMENTS
After discussion with family regarding risks and benefits of tPA, tPA was administered at 2109. He was not treated with endovascular stroke tehrapy given his low NIHSS and likely not a proximal occlusion. He was not an endovascula candidate because of low NIHSS, likley not proximal MCA occlusion, and advanced age. On exam, he is frail, awake and alert, Ox3, with moderate to severe dysarthria, a dense right facial droop, and a clumsy right hand, with a RUE drift and 5-/5 proximal RUE power, 4/5  and decreased FFM. He has 5/5 power RLE, and LUE/LLE, and normal sensation to LT. EOMI, VFF. NIHSS 5. Impression is cerebral embolism with infarction, clumsy hand dysarthria, so likely left MCA cortical or subcortical infarct. 1/5 CTH: New lucency in the left lentiform nucleus/external capsule suspicious for an evolving acute infarct. No acute hemorrhage. Correlate with MRI ordered by the time of this dictation. CXR: Negative

## 2017-01-07 NOTE — SWALLOW VFSS/MBS ASSESSMENT ADULT - RESIDUE IN LARYNGEAL VESTIBULE / VENTRICLE
Trace/Mild/along the laryngeal surface of the epiglottis and the vocal folds Trace/Mild/along laryngeal surface of the epiglottis and vocal folds

## 2017-01-07 NOTE — SWALLOW VFSS/MBS ASSESSMENT ADULT - SLP GENERAL OBSERVATIONS
Pt found in bed awake and alert. Moderate-Severe Dysarthria. Pt follows commands and serves as a fair historian. Pt verbal expresses wants and needs however he requires verbal cues to slow rate of speech and over articulate in order to increase intelligibility. Pt arrived to radiology via stretcher and moved to [facility]. Moderate-Severe Dysarthria. Pt follows commands, reduced intelligibility. Right sided weakness.

## 2017-01-07 NOTE — SWALLOW VFSS/MBS ASSESSMENT ADULT - ROSENBEK'S PENETRATION ASPIRATION SCALE
(1) no aspiration, contrast does not enter airway (6) contrast passes glottis, no subglottic residue remains (aspiration)

## 2017-01-07 NOTE — SWALLOW VFSS/MBS ASSESSMENT ADULT - LARYNGEAL PENETRATION DURING THE SWALLOW - SILENT
Mild/Trace/over the arytenoids, persistent, without retreival over the arytenoids, without retrieval/Trace/Mild

## 2017-01-07 NOTE — SWALLOW VFSS/MBS ASSESSMENT ADULT - ORAL PHASE
Reduced anterior - posterior transport/moderate spillover to the level of the aryepiglottic folds prior to swallow trigger/Residue in oral cavity moderate spillover to the level of the aryepiglottic folds/Uncontrolled bolus / spillover in hypopharynx/Residue in oral cavity/Incomplete tongue to palate contact/Reduced anterior - posterior transport/Uncontrolled bolus / spillover in lucinda-pharynx spillover of the bolus into the open airway/Reduced anterior - posterior transport/Residue in oral cavity

## 2017-01-07 NOTE — SWALLOW VFSS/MBS ASSESSMENT ADULT - ORAL PHASE COMMENTS
penetration over the aryepiglottic folds and laryngeal surface of the epiglottis prior to the trigger of the swallow, without retrieval penetration prior to the trigger of the swallow over the laryngeal surface of the epiglottis, without retrieval

## 2017-01-07 NOTE — SWALLOW VFSS/MBS ASSESSMENT ADULT - ASPIRATION DURING THE SWALLOW - SILENT
with retrieval to the level of the vocal folds/Trace/Mild with retrieval to the level of the vocal folds/Trace

## 2017-01-08 LAB
ANION GAP SERPL CALC-SCNC: 14 MMOL/L — SIGNIFICANT CHANGE UP (ref 5–17)
BUN SERPL-MCNC: 18 MG/DL — SIGNIFICANT CHANGE UP (ref 7–23)
CALCIUM SERPL-MCNC: 8.9 MG/DL — SIGNIFICANT CHANGE UP (ref 8.4–10.5)
CHLORIDE SERPL-SCNC: 104 MMOL/L — SIGNIFICANT CHANGE UP (ref 96–108)
CO2 SERPL-SCNC: 26 MMOL/L — SIGNIFICANT CHANGE UP (ref 22–31)
CREAT SERPL-MCNC: 0.69 MG/DL — SIGNIFICANT CHANGE UP (ref 0.5–1.3)
GLUCOSE SERPL-MCNC: 146 MG/DL — HIGH (ref 70–99)
HCT VFR BLD CALC: 35.7 % — LOW (ref 39–50)
HGB BLD-MCNC: 13 G/DL — SIGNIFICANT CHANGE UP (ref 13–17)
MCHC RBC-ENTMCNC: 33.3 PG — SIGNIFICANT CHANGE UP (ref 27–34)
MCHC RBC-ENTMCNC: 36.3 GM/DL — HIGH (ref 32–36)
MCV RBC AUTO: 91.7 FL — SIGNIFICANT CHANGE UP (ref 80–100)
PLATELET # BLD AUTO: 205 K/UL — SIGNIFICANT CHANGE UP (ref 150–400)
POTASSIUM SERPL-MCNC: 4 MMOL/L — SIGNIFICANT CHANGE UP (ref 3.5–5.3)
POTASSIUM SERPL-SCNC: 4 MMOL/L — SIGNIFICANT CHANGE UP (ref 3.5–5.3)
RBC # BLD: 3.89 M/UL — LOW (ref 4.2–5.8)
RBC # FLD: 12.7 % — SIGNIFICANT CHANGE UP (ref 10.3–14.5)
SODIUM SERPL-SCNC: 144 MMOL/L — SIGNIFICANT CHANGE UP (ref 135–145)
WBC # BLD: 7.1 K/UL — SIGNIFICANT CHANGE UP (ref 3.8–10.5)
WBC # FLD AUTO: 7.1 K/UL — SIGNIFICANT CHANGE UP (ref 3.8–10.5)

## 2017-01-08 RX ORDER — LEVOTHYROXINE SODIUM 125 MCG
25 TABLET ORAL DAILY
Qty: 0 | Refills: 0 | Status: DISCONTINUED | OUTPATIENT
Start: 2017-01-08 | End: 2017-01-10

## 2017-01-08 RX ORDER — ASPIRIN/CALCIUM CARB/MAGNESIUM 324 MG
300 TABLET ORAL DAILY
Qty: 0 | Refills: 0 | Status: DISCONTINUED | OUTPATIENT
Start: 2017-01-08 | End: 2017-01-10

## 2017-01-08 RX ADMIN — Medication 3 MILLILITER(S): at 17:15

## 2017-01-08 RX ADMIN — Medication 25 MICROGRAM(S): at 09:47

## 2017-01-08 RX ADMIN — ENOXAPARIN SODIUM 40 MILLIGRAM(S): 100 INJECTION SUBCUTANEOUS at 11:05

## 2017-01-08 RX ADMIN — Medication 3 MILLILITER(S): at 06:17

## 2017-01-08 RX ADMIN — Medication 1 SPRAY(S): at 16:48

## 2017-01-08 RX ADMIN — Medication 3 MILLILITER(S): at 00:20

## 2017-01-08 RX ADMIN — Medication 3 MILLILITER(S): at 11:04

## 2017-01-08 RX ADMIN — Medication 300 MILLIGRAM(S): at 11:04

## 2017-01-09 LAB
C DIFF BY PCR RESULT: SIGNIFICANT CHANGE UP
C DIFF TOX GENS STL QL NAA+PROBE: SIGNIFICANT CHANGE UP

## 2017-01-09 PROCEDURE — 71010: CPT | Mod: 26

## 2017-01-09 PROCEDURE — 99222 1ST HOSP IP/OBS MODERATE 55: CPT | Mod: GC

## 2017-01-09 RX ORDER — IPRATROPIUM/ALBUTEROL SULFATE 18-103MCG
3 AEROSOL WITH ADAPTER (GRAM) INHALATION EVERY 6 HOURS
Qty: 0 | Refills: 0 | Status: DISCONTINUED | OUTPATIENT
Start: 2017-01-09 | End: 2017-01-09

## 2017-01-09 RX ORDER — HYDRALAZINE HCL 50 MG
10 TABLET ORAL ONCE
Qty: 0 | Refills: 0 | Status: COMPLETED | OUTPATIENT
Start: 2017-01-09 | End: 2017-01-09

## 2017-01-09 RX ADMIN — ENOXAPARIN SODIUM 40 MILLIGRAM(S): 100 INJECTION SUBCUTANEOUS at 11:59

## 2017-01-09 RX ADMIN — Medication 300 MILLIGRAM(S): at 11:59

## 2017-01-09 RX ADMIN — Medication 3 MILLILITER(S): at 00:59

## 2017-01-09 RX ADMIN — Medication 25 MICROGRAM(S): at 05:32

## 2017-01-09 RX ADMIN — Medication 1 SPRAY(S): at 11:59

## 2017-01-09 RX ADMIN — Medication 3 MILLILITER(S): at 11:59

## 2017-01-09 RX ADMIN — Medication 10 MILLIGRAM(S): at 05:15

## 2017-01-09 RX ADMIN — Medication 3 MILLILITER(S): at 05:15

## 2017-01-09 RX ADMIN — Medication 3 MILLILITER(S): at 17:13

## 2017-01-10 PROCEDURE — 43246 EGD PLACE GASTROSTOMY TUBE: CPT | Mod: GC

## 2017-01-10 RX ORDER — CITALOPRAM 10 MG/1
20 TABLET, FILM COATED ORAL DAILY
Qty: 0 | Refills: 0 | Status: DISCONTINUED | OUTPATIENT
Start: 2017-01-10 | End: 2017-01-12

## 2017-01-10 RX ORDER — ATORVASTATIN CALCIUM 80 MG/1
80 TABLET, FILM COATED ORAL AT BEDTIME
Qty: 0 | Refills: 0 | Status: DISCONTINUED | OUTPATIENT
Start: 2017-01-10 | End: 2017-01-12

## 2017-01-10 RX ORDER — ENOXAPARIN SODIUM 100 MG/ML
40 INJECTION SUBCUTANEOUS DAILY
Qty: 0 | Refills: 0 | Status: DISCONTINUED | OUTPATIENT
Start: 2017-01-10 | End: 2017-01-12

## 2017-01-10 RX ORDER — ASPIRIN/CALCIUM CARB/MAGNESIUM 324 MG
81 TABLET ORAL DAILY
Qty: 0 | Refills: 0 | Status: DISCONTINUED | OUTPATIENT
Start: 2017-01-10 | End: 2017-01-12

## 2017-01-10 RX ORDER — LEVOTHYROXINE SODIUM 125 MCG
50 TABLET ORAL DAILY
Qty: 0 | Refills: 0 | Status: DISCONTINUED | OUTPATIENT
Start: 2017-01-10 | End: 2017-01-12

## 2017-01-10 RX ORDER — DONEPEZIL HYDROCHLORIDE 10 MG/1
1 TABLET, FILM COATED ORAL
Qty: 0 | Refills: 0 | COMMUNITY
Start: 2017-01-10

## 2017-01-10 RX ORDER — CITALOPRAM 10 MG/1
1 TABLET, FILM COATED ORAL
Qty: 0 | Refills: 0 | COMMUNITY
Start: 2017-01-10

## 2017-01-10 RX ORDER — DONEPEZIL HYDROCHLORIDE 10 MG/1
10 TABLET, FILM COATED ORAL AT BEDTIME
Qty: 0 | Refills: 0 | Status: DISCONTINUED | OUTPATIENT
Start: 2017-01-10 | End: 2017-01-12

## 2017-01-10 RX ORDER — LISINOPRIL 2.5 MG/1
2.5 TABLET ORAL DAILY
Qty: 0 | Refills: 0 | Status: DISCONTINUED | OUTPATIENT
Start: 2017-01-10 | End: 2017-01-12

## 2017-01-10 RX ADMIN — Medication 81 MILLIGRAM(S): at 21:33

## 2017-01-10 RX ADMIN — Medication 3 MILLILITER(S): at 23:20

## 2017-01-10 RX ADMIN — CITALOPRAM 20 MILLIGRAM(S): 10 TABLET, FILM COATED ORAL at 21:33

## 2017-01-10 RX ADMIN — Medication 3 MILLILITER(S): at 17:11

## 2017-01-10 RX ADMIN — Medication 25 MICROGRAM(S): at 06:00

## 2017-01-10 RX ADMIN — ATORVASTATIN CALCIUM 80 MILLIGRAM(S): 80 TABLET, FILM COATED ORAL at 21:33

## 2017-01-10 RX ADMIN — Medication 3 MILLILITER(S): at 11:50

## 2017-01-10 RX ADMIN — Medication 3 MILLILITER(S): at 06:01

## 2017-01-10 RX ADMIN — ENOXAPARIN SODIUM 40 MILLIGRAM(S): 100 INJECTION SUBCUTANEOUS at 21:34

## 2017-01-10 RX ADMIN — DONEPEZIL HYDROCHLORIDE 10 MILLIGRAM(S): 10 TABLET, FILM COATED ORAL at 21:33

## 2017-01-10 RX ADMIN — Medication 3 MILLILITER(S): at 00:30

## 2017-01-11 LAB
ANION GAP SERPL CALC-SCNC: 15 MMOL/L — SIGNIFICANT CHANGE UP (ref 5–17)
BUN SERPL-MCNC: 26 MG/DL — HIGH (ref 7–23)
CALCIUM SERPL-MCNC: 9.3 MG/DL — SIGNIFICANT CHANGE UP (ref 8.4–10.5)
CHLORIDE SERPL-SCNC: 108 MMOL/L — SIGNIFICANT CHANGE UP (ref 96–108)
CO2 SERPL-SCNC: 24 MMOL/L — SIGNIFICANT CHANGE UP (ref 22–31)
CREAT SERPL-MCNC: 0.75 MG/DL — SIGNIFICANT CHANGE UP (ref 0.5–1.3)
GLUCOSE SERPL-MCNC: 158 MG/DL — HIGH (ref 70–99)
HCT VFR BLD CALC: 38.4 % — LOW (ref 39–50)
HGB BLD-MCNC: 12.2 G/DL — LOW (ref 13–17)
MCHC RBC-ENTMCNC: 30.2 PG — SIGNIFICANT CHANGE UP (ref 27–34)
MCHC RBC-ENTMCNC: 31.8 GM/DL — LOW (ref 32–36)
MCV RBC AUTO: 95 FL — SIGNIFICANT CHANGE UP (ref 80–100)
PLATELET # BLD AUTO: 219 K/UL — SIGNIFICANT CHANGE UP (ref 150–400)
POTASSIUM SERPL-MCNC: 4.1 MMOL/L — SIGNIFICANT CHANGE UP (ref 3.5–5.3)
POTASSIUM SERPL-SCNC: 4.1 MMOL/L — SIGNIFICANT CHANGE UP (ref 3.5–5.3)
RBC # BLD: 4.04 M/UL — LOW (ref 4.2–5.8)
RBC # FLD: 14.3 % — SIGNIFICANT CHANGE UP (ref 10.3–14.5)
SODIUM SERPL-SCNC: 147 MMOL/L — HIGH (ref 135–145)
WBC # BLD: 7.74 K/UL — SIGNIFICANT CHANGE UP (ref 3.8–10.5)
WBC # FLD AUTO: 7.74 K/UL — SIGNIFICANT CHANGE UP (ref 3.8–10.5)

## 2017-01-11 PROCEDURE — 99231 SBSQ HOSP IP/OBS SF/LOW 25: CPT

## 2017-01-11 RX ADMIN — Medication 3 MILLILITER(S): at 18:58

## 2017-01-11 RX ADMIN — CITALOPRAM 20 MILLIGRAM(S): 10 TABLET, FILM COATED ORAL at 11:35

## 2017-01-11 RX ADMIN — Medication 81 MILLIGRAM(S): at 11:35

## 2017-01-11 RX ADMIN — Medication 50 MICROGRAM(S): at 05:13

## 2017-01-11 RX ADMIN — ENOXAPARIN SODIUM 40 MILLIGRAM(S): 100 INJECTION SUBCUTANEOUS at 11:36

## 2017-01-11 RX ADMIN — DONEPEZIL HYDROCHLORIDE 10 MILLIGRAM(S): 10 TABLET, FILM COATED ORAL at 21:52

## 2017-01-11 RX ADMIN — Medication 3 MILLILITER(S): at 05:13

## 2017-01-11 RX ADMIN — Medication 1: at 18:18

## 2017-01-11 RX ADMIN — ATORVASTATIN CALCIUM 80 MILLIGRAM(S): 80 TABLET, FILM COATED ORAL at 21:52

## 2017-01-11 RX ADMIN — Medication 1: at 13:30

## 2017-01-11 RX ADMIN — Medication 3 MILLILITER(S): at 11:34

## 2017-01-11 RX ADMIN — LISINOPRIL 2.5 MILLIGRAM(S): 2.5 TABLET ORAL at 05:13

## 2017-01-11 RX ADMIN — Medication 1: at 09:26

## 2017-01-12 VITALS
HEART RATE: 79 BPM | RESPIRATION RATE: 18 BRPM | OXYGEN SATURATION: 93 % | TEMPERATURE: 99 F | DIASTOLIC BLOOD PRESSURE: 69 MMHG | SYSTOLIC BLOOD PRESSURE: 147 MMHG

## 2017-01-12 PROCEDURE — 82962 GLUCOSE BLOOD TEST: CPT

## 2017-01-12 PROCEDURE — 99285 EMERGENCY DEPT VISIT HI MDM: CPT | Mod: 25

## 2017-01-12 PROCEDURE — 70551 MRI BRAIN STEM W/O DYE: CPT

## 2017-01-12 PROCEDURE — 97110 THERAPEUTIC EXERCISES: CPT

## 2017-01-12 PROCEDURE — 97162 PT EVAL MOD COMPLEX 30 MIN: CPT

## 2017-01-12 PROCEDURE — 83036 HEMOGLOBIN GLYCOSYLATED A1C: CPT

## 2017-01-12 PROCEDURE — 84484 ASSAY OF TROPONIN QUANT: CPT

## 2017-01-12 PROCEDURE — 80053 COMPREHEN METABOLIC PANEL: CPT

## 2017-01-12 PROCEDURE — 80048 BASIC METABOLIC PNL TOTAL CA: CPT

## 2017-01-12 PROCEDURE — 87493 C DIFF AMPLIFIED PROBE: CPT

## 2017-01-12 PROCEDURE — 92610 EVALUATE SWALLOWING FUNCTION: CPT

## 2017-01-12 PROCEDURE — 94640 AIRWAY INHALATION TREATMENT: CPT

## 2017-01-12 PROCEDURE — 86901 BLOOD TYPING SEROLOGIC RH(D): CPT

## 2017-01-12 PROCEDURE — 85610 PROTHROMBIN TIME: CPT

## 2017-01-12 PROCEDURE — 74230 X-RAY XM SWLNG FUNCJ C+: CPT

## 2017-01-12 PROCEDURE — 85027 COMPLETE CBC AUTOMATED: CPT

## 2017-01-12 PROCEDURE — 97166 OT EVAL MOD COMPLEX 45 MIN: CPT

## 2017-01-12 PROCEDURE — 97112 NEUROMUSCULAR REEDUCATION: CPT

## 2017-01-12 PROCEDURE — 85730 THROMBOPLASTIN TIME PARTIAL: CPT

## 2017-01-12 PROCEDURE — 97760 ORTHOTIC MGMT&TRAING 1ST ENC: CPT

## 2017-01-12 PROCEDURE — 86850 RBC ANTIBODY SCREEN: CPT

## 2017-01-12 PROCEDURE — 86900 BLOOD TYPING SEROLOGIC ABO: CPT

## 2017-01-12 PROCEDURE — 80061 LIPID PANEL: CPT

## 2017-01-12 PROCEDURE — A9585: CPT

## 2017-01-12 PROCEDURE — 92523 SPEECH SOUND LANG COMPREHEN: CPT

## 2017-01-12 PROCEDURE — 96374 THER/PROPH/DIAG INJ IV PUSH: CPT

## 2017-01-12 PROCEDURE — 71045 X-RAY EXAM CHEST 1 VIEW: CPT

## 2017-01-12 PROCEDURE — 93005 ELECTROCARDIOGRAM TRACING: CPT

## 2017-01-12 PROCEDURE — 70544 MR ANGIOGRAPHY HEAD W/O DYE: CPT

## 2017-01-12 PROCEDURE — 97530 THERAPEUTIC ACTIVITIES: CPT

## 2017-01-12 PROCEDURE — 70548 MR ANGIOGRAPHY NECK W/DYE: CPT

## 2017-01-12 PROCEDURE — 92611 MOTION FLUOROSCOPY/SWALLOW: CPT

## 2017-01-12 PROCEDURE — 70450 CT HEAD/BRAIN W/O DYE: CPT

## 2017-01-12 RX ORDER — DONEPEZIL HYDROCHLORIDE 10 MG/1
1 TABLET, FILM COATED ORAL
Qty: 0 | Refills: 0 | COMMUNITY
Start: 2017-01-12

## 2017-01-12 RX ORDER — LISINOPRIL 2.5 MG/1
1 TABLET ORAL
Qty: 0 | Refills: 0 | COMMUNITY
Start: 2017-01-12

## 2017-01-12 RX ADMIN — ENOXAPARIN SODIUM 40 MILLIGRAM(S): 100 INJECTION SUBCUTANEOUS at 13:05

## 2017-01-12 RX ADMIN — LISINOPRIL 2.5 MILLIGRAM(S): 2.5 TABLET ORAL at 05:05

## 2017-01-12 RX ADMIN — Medication 50 MICROGRAM(S): at 05:05

## 2017-01-12 RX ADMIN — Medication: at 13:05

## 2017-01-12 RX ADMIN — Medication: at 09:00

## 2017-01-12 RX ADMIN — Medication 81 MILLIGRAM(S): at 13:04

## 2017-01-12 RX ADMIN — Medication 3 MILLILITER(S): at 17:28

## 2017-01-12 RX ADMIN — Medication 3 MILLILITER(S): at 00:46

## 2017-01-12 RX ADMIN — Medication 3 MILLILITER(S): at 05:05

## 2017-01-12 RX ADMIN — CITALOPRAM 20 MILLIGRAM(S): 10 TABLET, FILM COATED ORAL at 13:04

## 2017-01-12 RX ADMIN — Medication 3 MILLILITER(S): at 13:04

## 2017-01-13 RX ORDER — TRAMADOL HYDROCHLORIDE 50 MG/1
1 TABLET ORAL
Qty: 0 | Refills: 0 | COMMUNITY

## 2017-01-13 RX ORDER — CITALOPRAM 10 MG/1
1 TABLET, FILM COATED ORAL
Qty: 0 | Refills: 0 | COMMUNITY

## 2017-01-13 RX ORDER — CHOLECALCIFEROL (VITAMIN D3) 125 MCG
2 CAPSULE ORAL
Qty: 0 | Refills: 0 | COMMUNITY

## 2017-01-13 RX ORDER — DONEPEZIL HYDROCHLORIDE 10 MG/1
1 TABLET, FILM COATED ORAL
Qty: 0 | Refills: 0 | COMMUNITY

## 2017-01-13 RX ORDER — LEVOTHYROXINE SODIUM 125 MCG
1 TABLET ORAL
Qty: 0 | Refills: 0 | COMMUNITY

## 2017-01-13 RX ORDER — CLOPIDOGREL BISULFATE 75 MG/1
1 TABLET, FILM COATED ORAL
Qty: 0 | Refills: 0 | COMMUNITY

## 2017-01-13 RX ORDER — INSULIN GLARGINE 100 [IU]/ML
23 INJECTION, SOLUTION SUBCUTANEOUS
Qty: 0 | Refills: 0 | COMMUNITY

## 2017-01-13 RX ORDER — REPAGLINIDE 1 MG/1
1 TABLET ORAL
Qty: 0 | Refills: 0 | COMMUNITY

## 2017-01-13 RX ORDER — ATORVASTATIN CALCIUM 80 MG/1
1 TABLET, FILM COATED ORAL
Qty: 0 | Refills: 0 | COMMUNITY

## 2017-01-19 ENCOUNTER — EMERGENCY (EMERGENCY)
Facility: HOSPITAL | Age: 82
LOS: 1 days | End: 2017-01-19
Attending: EMERGENCY MEDICINE | Admitting: EMERGENCY MEDICINE
Payer: COMMERCIAL

## 2017-01-19 DIAGNOSIS — I46.9 CARDIAC ARREST, CAUSE UNSPECIFIED: ICD-10-CM

## 2017-01-19 DIAGNOSIS — Z79.2 LONG TERM (CURRENT) USE OF ANTIBIOTICS: ICD-10-CM

## 2017-01-19 DIAGNOSIS — Z78.9 OTHER SPECIFIED HEALTH STATUS: Chronic | ICD-10-CM

## 2017-01-19 DIAGNOSIS — J44.9 CHRONIC OBSTRUCTIVE PULMONARY DISEASE, UNSPECIFIED: ICD-10-CM

## 2017-01-19 DIAGNOSIS — E11.9 TYPE 2 DIABETES MELLITUS WITHOUT COMPLICATIONS: ICD-10-CM

## 2017-01-19 DIAGNOSIS — I10 ESSENTIAL (PRIMARY) HYPERTENSION: ICD-10-CM

## 2017-01-19 DIAGNOSIS — Z98.890 OTHER SPECIFIED POSTPROCEDURAL STATES: ICD-10-CM

## 2017-01-19 DIAGNOSIS — Z98.890 OTHER SPECIFIED POSTPROCEDURAL STATES: Chronic | ICD-10-CM

## 2017-01-19 DIAGNOSIS — Z79.02 LONG TERM (CURRENT) USE OF ANTITHROMBOTICS/ANTIPLATELETS: ICD-10-CM

## 2017-01-19 DIAGNOSIS — Z79.4 LONG TERM (CURRENT) USE OF INSULIN: ICD-10-CM

## 2017-01-19 PROCEDURE — 99285 EMERGENCY DEPT VISIT HI MDM: CPT

## 2017-01-19 PROCEDURE — 99285 EMERGENCY DEPT VISIT HI MDM: CPT | Mod: GC

## 2017-01-19 NOTE — ED PROVIDER NOTE - PROGRESS NOTE DETAILS
Attempted to call sons Inocencio Henderson and Ruperto Henderson. No call back . WIll attempt to call again

## 2017-01-19 NOTE — ED PROVIDER NOTE - OBJECTIVE STATEMENT
89 y/o M  multiple med problems bibems s/p cardiac arrest at nursing home.Pt found down at 5;30am. Pt  given epi x 4, bicarb, d50  by EMS. Noted to gain a pulses for 6 minutes then back to asystole. Pt arrives to ED intubated  with active chest compressions being performed. Pt in PEA  given Epi x 3, Calcium gluconate  without any ROSC

## 2017-01-19 NOTE — ED ADULT NURSE REASSESSMENT NOTE - NS ED NURSE REASSESS COMMENT FT1
Previous RN, Alea Bearden stated that all of the cardiac arrest paperwork including the donor paperwork was completed. Pt has no personal belongs with him. Family saw the pt and left. Pt was prepared for the [facility] and all tags were applied to the pt. [facility] was called for transport.

## 2017-01-19 NOTE — ED ADULT NURSE NOTE - PSH
History of hip surgery    Presence of stent in artery  History of vascular stenting of lower circulation, on plavix  S/P hip hemiarthroplasty  IM walker

## 2017-01-19 NOTE — ED PROVIDER NOTE - ATTENDING CONTRIBUTION TO CARE
I have examined and evaluated this patient with the above resident or PA, and agree with the documented clinical history, exam and plan.   Briefly: 89 yo M, from NH, with cardiac arrest; per ems, upon arrival at around 550am, patient unresposnive pulseless, pea; epix2, d50 x1 and bicarb x2, rosc for few minutes, then loss of pulse; epix4 additionally by ems at scene and en route, without further rosc.  Intubated by EMS in field.  In ED, pulseless with PEA; no cardiac activity on bedside echo, epix4 given, calcium gluconate given (1g) and bicarb x1; no rosc; TOD 0655

## 2017-01-19 NOTE — ED ADULT NURSE NOTE - PMH
Acquired hypothyroidism    Chronic obstructive pulmonary disease, unspecified COPD type    COPD (chronic obstructive pulmonary disease)  Not on home inhaler or O2  Diabetes mellitus    Diabetic neuropathy    DM (diabetes mellitus)    Essential hypertension    HLD (hyperlipidemia)    HTN (hypertension)    Spinal stenosis

## 2017-01-19 NOTE — ED ADULT NURSE NOTE - OBJECTIVE STATEMENT
89 y/o male presenting to the ED with PMH of MI, CVA, COPD, HTN as per ems with complaints of unwitnessed cardiac arrest at nursing home; CPR started in the field with patient receiving 4 rounds of epi and one amp of bicarb; IO was inserted in the field by ems and patient intubated; Compressions continued upon arrival to ed and acls protocols followed; no family at bedside; please refer to the cardiac arrest data sheet for additional information

## 2017-01-24 ENCOUNTER — APPOINTMENT (OUTPATIENT)
Dept: NEUROLOGY | Facility: CLINIC | Age: 82
End: 2017-01-24

## 2017-01-30 ENCOUNTER — APPOINTMENT (OUTPATIENT)
Dept: NEUROLOGY | Facility: CLINIC | Age: 82
End: 2017-01-30

## 2017-02-20 PROBLEM — E11.9 TYPE 2 DIABETES MELLITUS WITHOUT COMPLICATIONS: Chronic | Status: ACTIVE | Noted: 2017-01-04

## 2017-02-20 PROBLEM — I10 ESSENTIAL (PRIMARY) HYPERTENSION: Chronic | Status: ACTIVE | Noted: 2017-01-04

## 2017-02-20 PROBLEM — J44.9 CHRONIC OBSTRUCTIVE PULMONARY DISEASE, UNSPECIFIED: Chronic | Status: ACTIVE | Noted: 2017-01-04

## 2017-12-07 NOTE — ED PROVIDER NOTE - CRTICAL CARE TIME SPENT (MIN)
Post-Care Instructions: I reviewed with the patient in detail post-care instructions. Patient is to wear sunprotection, and avoid picking at any of the treated lesions. Pt may apply Vaseline to crusted or scabbing areas. Aperture Size (Optional): C Detail Level: Simple Consent: The patient's consent was obtained including but not limited to risks of crusting, scabbing, blistering, scarring, darker or lighter pigmentary change, recurrence, incomplete removal and infection. Duration Of Freeze Thaw-Cycle (Seconds): 3 Number Of Freeze-Thaw Cycles: 3 freeze-thaw cycles Render Post-Care Instructions In Note?: yes 30

## 2022-01-16 NOTE — ED PROVIDER NOTE - PMH
Chronic obstructive pulmonary disease, unspecified COPD type    Diabetes mellitus    Essential hypertension
declines

## 2023-11-10 NOTE — ED PROVIDER NOTE - PMH
Acquired hypothyroidism    Chronic obstructive pulmonary disease, unspecified COPD type    COPD (chronic obstructive pulmonary disease)  Not on home inhaler or O2  Diabetes mellitus    Diabetic neuropathy    DM (diabetes mellitus)    Essential hypertension    HLD (hyperlipidemia)    HTN (hypertension)    Spinal stenosis
none